# Patient Record
Sex: MALE | Race: WHITE | Employment: FULL TIME
[De-identification: names, ages, dates, MRNs, and addresses within clinical notes are randomized per-mention and may not be internally consistent; named-entity substitution may affect disease eponyms.]

---

## 2022-03-19 PROBLEM — G47.00 INSOMNIA: Status: ACTIVE | Noted: 2021-02-02

## 2022-03-19 PROBLEM — R35.1 BENIGN PROSTATIC HYPERPLASIA WITH NOCTURIA: Status: ACTIVE | Noted: 2021-02-02

## 2022-03-19 PROBLEM — K21.9 GASTROESOPHAGEAL REFLUX DISEASE: Status: ACTIVE | Noted: 2021-02-02

## 2022-03-19 PROBLEM — N40.1 BENIGN PROSTATIC HYPERPLASIA WITH NOCTURIA: Status: ACTIVE | Noted: 2021-02-02

## 2022-03-20 PROBLEM — G44.219 EPISODIC TENSION-TYPE HEADACHE, NOT INTRACTABLE: Status: ACTIVE | Noted: 2021-02-02

## 2022-03-20 PROBLEM — M1A.09X0 CHRONIC GOUT OF MULTIPLE SITES: Status: ACTIVE | Noted: 2021-02-02

## 2022-06-28 NOTE — PROGRESS NOTES
Tammi Rodrigez (:  1960) is a Established patient, here for evaluation of the following:    Assessment & Plan   Below is the assessment and plan developed based on review of pertinent history, physical exam, labs, studies, and medications. 1. Benign prostatic hyperplasia with nocturia  PSA within normal limits on 2021  Continue tamsulosin    2. Chronic gout of multiple sites, unspecified cause  Uric acid at goal at 5.4 on 2021  Continue allopurinol    3. Gastroesophageal reflux disease, unspecified whether esophagitis present  Lifestyle modifications previously discussed  Continue as needed antacids versus H2 blockers versus PPI    4. Pure hypercholesterolemia  Lipid panel on 2021 with ASCVD risk of 7.2%  Continue lifestyle modifications, hold off statin therapy at this time    5. Erectile dysfunction, unspecified erectile dysfunction type  Continue as needed sildenafil    6. Colon cancer screening  Discussed with patient indications and options for colorectal cancer screening including screening colonoscopy versus stool based testing  Patient verbalizes understanding and desires to proceed with screening colonoscopy  Given concurrent GERD will refer to gastroenterology for consideration of EGD at time of colonoscopy    7.  Prostate cancer screening  Discussed with patient indications, risk versus benefit of prostate cancer screening including PSA versus TEO  Educated patient that abnormalities in screening if present could be attributed to causes other than prostate cancer  However if screening abnormal and based upon degree may warrant urological evaluation including the potential for imaging and even biopsy  Patient verbalizes understanding and desires to proceed with PSA testing        Subjective   HPI  Review of Systems       Objective   Patient-Reported Vitals  No data recorded     Physical Exam  [INSTRUCTIONS:  \"[x]\" Indicates a positive item  \"[]\" Indicates a negative item  -- DELETE ALL ITEMS NOT EXAMINED]    Constitutional: [x] Appears well-developed and well-nourished [x] No apparent distress      [] Abnormal -     Mental status: [x] Alert and awake  [x] Oriented to person/place/time [x] Able to follow commands    [] Abnormal -     Eyes:   EOM    [x]  Normal    [] Abnormal -   Sclera  [x]  Normal    [] Abnormal -          Discharge [x]  None visible   [] Abnormal -     HENT: [x] Normocephalic, atraumatic  [] Abnormal -   [x] Mouth/Throat: Mucous membranes are moist    External Ears [x] Normal  [] Abnormal -    Neck: [x] No visualized mass [] Abnormal -     Pulmonary/Chest: [x] Respiratory effort normal   [x] No visualized signs of difficulty breathing or respiratory distress        [] Abnormal -      Musculoskeletal:   [x] Normal gait with no signs of ataxia         [x] Normal range of motion of neck        [] Abnormal -     Neurological:        [x] No Facial Asymmetry (Cranial nerve 7 motor function) (limited exam due to video visit)          [x] No gaze palsy        [] Abnormal -          Skin:        [x] No significant exanthematous lesions or discoloration noted on facial skin         [] Abnormal -            Psychiatric:       [x] Normal Affect [] Abnormal -        [x] No Hallucinations    Other pertinent observable physical exam findings:-             {Time Documentation Optional:[id number]}    [facility], was evaluated through a synchronous (real-time) audio-video encounter. The patient (or guardian if applicable) is aware that this is a billable service, which includes applicable co-pays. This Virtual Visit was conducted with patient's (and/or legal guardian's) consent. The visit was conducted pursuant to the emergency declaration under the [address] [place] [address], [address] [place] [address] authority and the [employer] Act. Patient identification was verified, and a caregiver was present when appropriate.    The patient was located at {Telehealth POS - Patient:[id number]}. Provider was located at {Telehealth POS - Provider:[id number]}.         --Kennedy Baum DO

## 2022-07-01 ENCOUNTER — TELEMEDICINE (OUTPATIENT)
Dept: INTERNAL MEDICINE CLINIC | Facility: CLINIC | Age: 62
End: 2022-07-01

## 2022-07-01 DIAGNOSIS — R35.1 BENIGN PROSTATIC HYPERPLASIA WITH NOCTURIA: Primary | ICD-10-CM

## 2022-07-01 DIAGNOSIS — N52.9 ERECTILE DYSFUNCTION, UNSPECIFIED ERECTILE DYSFUNCTION TYPE: ICD-10-CM

## 2022-07-01 DIAGNOSIS — N40.1 BENIGN PROSTATIC HYPERPLASIA WITH NOCTURIA: Primary | ICD-10-CM

## 2022-07-01 DIAGNOSIS — E78.00 PURE HYPERCHOLESTEROLEMIA: ICD-10-CM

## 2022-07-01 DIAGNOSIS — Z12.11 COLON CANCER SCREENING: ICD-10-CM

## 2022-07-01 DIAGNOSIS — K21.9 GASTROESOPHAGEAL REFLUX DISEASE, UNSPECIFIED WHETHER ESOPHAGITIS PRESENT: ICD-10-CM

## 2022-07-01 DIAGNOSIS — Z12.5 PROSTATE CANCER SCREENING: ICD-10-CM

## 2022-07-01 DIAGNOSIS — M1A.09X0 CHRONIC GOUT OF MULTIPLE SITES, UNSPECIFIED CAUSE: ICD-10-CM

## 2022-07-01 RX ORDER — SILDENAFIL 100 MG/1
100 TABLET, FILM COATED ORAL PRN
Qty: 30 TABLET | Refills: 0 | Status: CANCELLED | OUTPATIENT
Start: 2022-07-01

## 2022-07-01 ASSESSMENT — PATIENT HEALTH QUESTIONNAIRE - PHQ9
SUM OF ALL RESPONSES TO PHQ9 QUESTIONS 1 & 2: 0
SUM OF ALL RESPONSES TO PHQ QUESTIONS 1-9: 0
1. LITTLE INTEREST OR PLEASURE IN DOING THINGS: 0
2. FEELING DOWN, DEPRESSED OR HOPELESS: 0
SUM OF ALL RESPONSES TO PHQ QUESTIONS 1-9: 0

## 2022-08-11 RX ORDER — SILDENAFIL 100 MG/1
TABLET, FILM COATED ORAL
Qty: 30 TABLET | Refills: 0 | Status: SHIPPED | OUTPATIENT
Start: 2022-08-11 | End: 2022-08-26 | Stop reason: SDUPTHER

## 2022-08-26 ENCOUNTER — TELEMEDICINE (OUTPATIENT)
Dept: INTERNAL MEDICINE CLINIC | Facility: CLINIC | Age: 62
End: 2022-08-26
Payer: COMMERCIAL

## 2022-08-26 DIAGNOSIS — E78.00 PURE HYPERCHOLESTEROLEMIA: ICD-10-CM

## 2022-08-26 DIAGNOSIS — M1A.09X0 CHRONIC GOUT OF MULTIPLE SITES, UNSPECIFIED CAUSE: Primary | ICD-10-CM

## 2022-08-26 DIAGNOSIS — Z12.11 COLON CANCER SCREENING: ICD-10-CM

## 2022-08-26 DIAGNOSIS — R07.9 CHEST PAIN, UNSPECIFIED TYPE: ICD-10-CM

## 2022-08-26 DIAGNOSIS — Z12.5 PROSTATE CANCER SCREENING: ICD-10-CM

## 2022-08-26 DIAGNOSIS — N52.9 ERECTILE DYSFUNCTION, UNSPECIFIED ERECTILE DYSFUNCTION TYPE: ICD-10-CM

## 2022-08-26 PROCEDURE — 99214 OFFICE O/P EST MOD 30 MIN: CPT | Performed by: STUDENT IN AN ORGANIZED HEALTH CARE EDUCATION/TRAINING PROGRAM

## 2022-08-26 RX ORDER — SILDENAFIL 100 MG/1
TABLET, FILM COATED ORAL
Qty: 30 TABLET | Refills: 5 | Status: SHIPPED | OUTPATIENT
Start: 2022-08-26

## 2022-08-26 ASSESSMENT — ENCOUNTER SYMPTOMS
DIARRHEA: 0
BLOOD IN STOOL: 1
TROUBLE SWALLOWING: 0
VOMITING: 0
CONSTIPATION: 0
ABDOMINAL PAIN: 0
NAUSEA: 0

## 2022-08-26 NOTE — PROGRESS NOTES
Farhan Mayberry (:  1960) is a Established patient, here for evaluation of the following: Medication refill     Assessment & Plan   Below is the assessment and plan developed based on review of pertinent history, physical exam, labs, studies, and medications. 1. Chronic gout of multiple sites, unspecified cause  Uric acid at goal at 5.4 on 2021, will recheck  No longer taking allopurinol, fortunately no recent gout flares  Monitor off therapy at this time    - Uric Acid; Future    2. Pure hypercholesterolemia  Lipid panel on 2021 with ASCVD risk of 7.2%, will recheck  Continue lifestyle modifications, hold off statin therapy at this time    - CBC with Auto Differential; Future  - Comprehensive Metabolic Panel; Future  - Lipid Panel; Future  - TSH; Future  - T4, Free; Future    3. Erectile dysfunction, unspecified erectile dysfunction type  Continue as needed sildenafil    - sildenafil (VIAGRA) 100 MG tablet; Take 1/2 to one whole tablet as needed for ED 30 to 60 minutes prior to intercourse. Max of one tablet per day  Dispense: 30 tablet; Refill: 5    4. Colon cancer screening  Patient agreeable to screening colonoscopy for colorectal cancer screening  Referral to general surgery placed    - [address], [place]    5. Prostate cancer screening  Patient agreeable to PSA testing for prostate cancer screening, order placed    - PSA Screening; Future    6. Chest pain, unspecified type  Used to be stress related  Different than patient's reflux symptoms with patient noting in general improvement in GERD  Arrange for nursing visit in the next week or so for EKG    No follow-ups on file. Subjective   Patient is a 71-year-old  male who presents via virtual encounter today for follow-up and medication refill. Patient currently only taking as needed Viagra, having run out/stop his other medications. No recent gout flares off allopurinol.   Awakens on average once a night or so to urinate with some incomplete bladder emptying but states he did not notice a significant improvement while on Flomax. No dysuria or hematuria. Requesting refill of Viagra, noting improvement in erectile dysfunction. Did have a mild headache after use. No abdominal pain, difficulty swallowing, nausea, vomiting, diarrhea or constipation or straining with defecation. No history of hemorrhoids however patient does notice occasional blood with bowel movements but does not pay regular attention. Notes decreasing reflux. Still has not undergone screening colonoscopy. Patient has been noticing intermittent chest pain most recently occurring today at work. 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 Pain lasted for an hour described as soreness with questionable tightness and different than his reflux. Pain was 6/10 in severity. Did not radiate into his neck, arms or back. Review of Systems   HENT:  Negative for trouble swallowing. Cardiovascular:  Positive for chest pain. Gastrointestinal:  Positive for blood in stool. Negative for abdominal pain, constipation, diarrhea, nausea and vomiting. Genitourinary:         Occasional nocturia and incomplete bladder emptying   Neurological:  Positive for headaches.         Objective   Patient-Reported Vitals  No data recorded     Physical Exam  [INSTRUCTIONS:  \"[x]\" Indicates a positive item  \"[]\" Indicates a negative item  -- DELETE ALL ITEMS NOT EXAMINED]    Constitutional: [x] Appears well-developed and well-nourished [x] No apparent distress      [] Abnormal -     Mental status: [x] Alert and awake  [x] Oriented to person/place/time [x] Able to follow commands    [] Abnormal -     Eyes:   EOM    [x]  Normal    [] Abnormal -   Sclera  [x]  Normal    [] Abnormal -          Discharge []  None visible   [] Abnormal -     HENT: [x] Normocephalic, atraumatic  [] Abnormal -   [] Mouth/Throat: Mucous

## 2022-09-07 ENCOUNTER — CLINICAL DOCUMENTATION (OUTPATIENT)
Dept: SURGERY | Age: 62
End: 2022-09-07

## 2022-09-07 NOTE — PROGRESS NOTES
Referral received for routine colonoscopy- screening or surveillance only. Chart reviewed by me on September 7, 2022.  Uncertain screening hx; carries hx diverticulitis    Pt found to be acceptable candidate for direct scheduling if they are interested with the following special instructions (if any):

## 2022-10-20 ENCOUNTER — PREP FOR PROCEDURE (OUTPATIENT)
Dept: SURGERY | Age: 62
End: 2022-10-20

## 2022-10-20 PROBLEM — Z12.11 ENCOUNTER FOR SCREENING COLONOSCOPY: Status: ACTIVE | Noted: 2022-10-20

## 2022-10-20 RX ORDER — SOD SULF/POT CHLORIDE/MAG SULF 1.479 G
12 TABLET ORAL 2 TIMES DAILY
Qty: 24 TABLET | Refills: 0 | Status: SHIPPED | OUTPATIENT
Start: 2022-10-20

## 2022-11-19 PROBLEM — Z12.11 ENCOUNTER FOR SCREENING COLONOSCOPY: Status: RESOLVED | Noted: 2022-10-20 | Resolved: 2022-11-19

## 2022-12-16 PROBLEM — Z12.11 ENCOUNTER FOR SCREENING COLONOSCOPY: Status: ACTIVE | Noted: 2022-10-20

## 2023-01-05 ENCOUNTER — TELEPHONE (OUTPATIENT)
Dept: SURGERY | Age: 63
End: 2023-01-05

## 2023-01-15 PROBLEM — Z12.11 ENCOUNTER FOR SCREENING COLONOSCOPY: Status: RESOLVED | Noted: 2022-10-20 | Resolved: 2023-01-15

## 2023-03-03 PROBLEM — Z12.11 ENCOUNTER FOR SCREENING COLONOSCOPY: Status: ACTIVE | Noted: 2022-10-20

## 2023-04-02 PROBLEM — Z12.11 ENCOUNTER FOR SCREENING COLONOSCOPY: Status: RESOLVED | Noted: 2022-10-20 | Resolved: 2023-04-02

## 2023-11-03 ENCOUNTER — TELEPHONE (OUTPATIENT)
Dept: INTERNAL MEDICINE CLINIC | Facility: CLINIC | Age: 63
End: 2023-11-03

## 2023-11-03 DIAGNOSIS — N52.9 ERECTILE DYSFUNCTION, UNSPECIFIED ERECTILE DYSFUNCTION TYPE: ICD-10-CM

## 2023-11-03 RX ORDER — COLCHICINE 0.6 MG/1
TABLET ORAL
Qty: 30 TABLET | OUTPATIENT
Start: 2023-11-03

## 2023-11-03 RX ORDER — SILDENAFIL 100 MG/1
TABLET, FILM COATED ORAL
Qty: 30 TABLET | Refills: 5 | OUTPATIENT
Start: 2023-11-03

## 2023-11-03 NOTE — TELEPHONE ENCOUNTER
Needs refill on his Viagra and gout meds which pt does not know the name of gout meds.    Sent to [facility] in [place]

## 2023-11-03 NOTE — TELEPHONE ENCOUNTER
Patient is requesting refill for sildenafil (VIAGRA) 100 MG tablet & colchicine (COLCRYS) 0.6 MG tablet

## 2023-11-08 NOTE — TELEPHONE ENCOUNTER
LMOVM informing pt, per Dr. Boy Casarez, given patient has not been seen in over a year and at last visit he was not taking any gout medication he needs an IN OFFICE visit for refills and reassessment.

## 2024-01-04 DIAGNOSIS — N52.9 ERECTILE DYSFUNCTION, UNSPECIFIED ERECTILE DYSFUNCTION TYPE: ICD-10-CM

## 2024-01-04 RX ORDER — SILDENAFIL 100 MG/1
TABLET, FILM COATED ORAL
Qty: 10 TABLET | Refills: 0 | Status: SHIPPED | OUTPATIENT
Start: 2024-01-04

## 2024-01-04 NOTE — TELEPHONE ENCOUNTER
During patient's wife's visit with me today she relayed patient's request for refill of Viagra.  Patient has not been seen by me since August 2022.  I advised her that I would send in one LAST short term prescription however no further refills would be given without an up to date visit, no exceptions.     Orders Placed This Encounter    sildenafil (VIAGRA) 100 MG tablet     Sig: Take 1/2 to one whole tablet as needed for ED 30 to 60 minutes prior to intercourse. Max of one tablet per day     Dispense:  10 tablet     Refill:  0